# Patient Record
Sex: MALE | Race: BLACK OR AFRICAN AMERICAN | URBAN - METROPOLITAN AREA
[De-identification: names, ages, dates, MRNs, and addresses within clinical notes are randomized per-mention and may not be internally consistent; named-entity substitution may affect disease eponyms.]

---

## 2023-04-14 ENCOUNTER — OFFICE VISIT (OUTPATIENT)
Dept: URBAN - METROPOLITAN AREA CLINIC 109 | Facility: CLINIC | Age: 23
End: 2023-04-14
Payer: COMMERCIAL

## 2023-04-14 ENCOUNTER — WEB ENCOUNTER (OUTPATIENT)
Dept: URBAN - METROPOLITAN AREA CLINIC 109 | Facility: CLINIC | Age: 23
End: 2023-04-14

## 2023-04-14 VITALS
HEIGHT: 72 IN | BODY MASS INDEX: 26.03 KG/M2 | WEIGHT: 192.2 LBS | DIASTOLIC BLOOD PRESSURE: 67 MMHG | TEMPERATURE: 98.1 F | HEART RATE: 60 BPM | SYSTOLIC BLOOD PRESSURE: 111 MMHG

## 2023-04-14 DIAGNOSIS — R19.7 DIARRHEA, UNSPECIFIED TYPE: ICD-10-CM

## 2023-04-14 DIAGNOSIS — R10.13 DYSPEPSIA: ICD-10-CM

## 2023-04-14 PROCEDURE — 99204 OFFICE O/P NEW MOD 45 MIN: CPT | Performed by: INTERNAL MEDICINE

## 2023-04-14 RX ORDER — CHOLESTYRAMINE 4 G/9G
1 SCOOP POWDER, FOR SUSPENSION ORAL ONCE A DAY
Qty: 30 | Refills: 5 | OUTPATIENT
Start: 2023-04-14

## 2023-04-14 RX ORDER — PANTOPRAZOLE SODIUM 40 MG/1
1 TABLET TABLET, DELAYED RELEASE ORAL ONCE A DAY
Qty: 30 | Refills: 5 | OUTPATIENT
Start: 2023-04-14

## 2023-04-14 NOTE — HPI-TODAY'S VISIT:
Mr. Resendiz is a 23 y/o M who is here for irregular bowel movements, belching, and GERD. Reports that these symptoms are have been present nearly his whole life. He reports x1-2 BM/day. They are explosive/soft in nature. Rarely he will have a formed stool. He feels as though he has to push hard and will be on the toilet for up to 30mins or more. Denies overt GI bleeding or abd pain. He has not tried anything for this. Also reports increased belching, sour taste in his mouth, and esophgeal burning which has been present since he was a kid. He does not take anything for this. His friend was recently diagnosed and treated for h pylori and he is wondering if he might have that.

## 2023-07-01 LAB
Lab: (no result)
NAME ON REQUISITION:: (no result)
SPECIMEN TYPE RECEIVED:: (no result)
TEST ORDERED ON REQ:: (no result)

## 2023-07-07 ENCOUNTER — TELEPHONE ENCOUNTER (OUTPATIENT)
Dept: URBAN - METROPOLITAN AREA CLINIC 109 | Facility: CLINIC | Age: 23
End: 2023-07-07

## 2023-07-11 LAB
CLIENT CONTACT:: (no result)
COMMENT: (no result)
CONTACT: (no result)
HELICOBACTER PYLORI AG, EIA, STOOL: (no result)
TEST CODE:: (no result)
TEST NAME:: (no result)
TESTS AFFECTED: (no result)

## 2023-07-12 ENCOUNTER — OFFICE VISIT (OUTPATIENT)
Dept: URBAN - METROPOLITAN AREA CLINIC 109 | Facility: CLINIC | Age: 23
End: 2023-07-12

## 2023-07-27 ENCOUNTER — DASHBOARD ENCOUNTERS (OUTPATIENT)
Age: 23
End: 2023-07-27

## 2023-07-27 ENCOUNTER — OFFICE VISIT (OUTPATIENT)
Dept: URBAN - METROPOLITAN AREA TELEHEALTH 2 | Facility: TELEHEALTH | Age: 23
End: 2023-07-27
Payer: COMMERCIAL

## 2023-07-27 VITALS — WEIGHT: 192 LBS | HEIGHT: 72 IN | BODY MASS INDEX: 26.01 KG/M2

## 2023-07-27 DIAGNOSIS — K58.2 IRRITABLE BOWEL SYNDROME WITH ALTERNATING BOWEL HABITS: ICD-10-CM

## 2023-07-27 DIAGNOSIS — R10.13 DYSPEPSIA: ICD-10-CM

## 2023-07-27 PROBLEM — 14760008: Status: ACTIVE | Noted: 2023-07-27

## 2023-07-27 PROCEDURE — 99214 OFFICE O/P EST MOD 30 MIN: CPT | Performed by: INTERNAL MEDICINE

## 2023-07-27 RX ORDER — PANTOPRAZOLE SODIUM 40 MG/1
1 TABLET TABLET, DELAYED RELEASE ORAL ONCE A DAY
Qty: 30 | Refills: 5 | Status: ACTIVE | COMMUNITY
Start: 2023-04-14

## 2023-07-27 RX ORDER — CHOLESTYRAMINE 4 G/9G
1 SCOOP POWDER, FOR SUSPENSION ORAL ONCE A DAY
Qty: 30 | Refills: 5 | Status: ACTIVE | COMMUNITY
Start: 2023-04-14

## 2023-07-27 RX ORDER — LACTOBACIL 2/BIFIDO 1/S.THERMO 450B CELL
1 CAPSULE PACKET (EA) ORAL TWICE DAILY
Qty: 60 CAPSULE | Refills: 5 | OUTPATIENT
Start: 2023-07-27 | End: 2024-01-22

## 2023-07-27 NOTE — HPI-TODAY'S VISIT:
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 Resendiz is a 21 y/o M who is here for irregular bowel movements, belching, and GERD. Reports that these symptoms are have been present nearly his whole life. He reports x1-2 BM/day. They are explosive/soft in nature. Rarely he will have a formed stool. He feels as though he has to push hard and will be on the toilet for up to 30mins or more. Denies overt GI bleeding or abd pain. He has not tried anything for this. Also reports increased belching, sour taste in his mouth, and esophgeal burning which has been present since he was a kid. He does not take anything for this. His friend was recently diagnosed and treated for h pylori and he is wondering if he might have that.